# Patient Record
Sex: MALE | Race: WHITE | NOT HISPANIC OR LATINO | URBAN - METROPOLITAN AREA
[De-identification: names, ages, dates, MRNs, and addresses within clinical notes are randomized per-mention and may not be internally consistent; named-entity substitution may affect disease eponyms.]

---

## 2021-05-24 ENCOUNTER — OUTPATIENT (OUTPATIENT)
Dept: OUTPATIENT SERVICES | Facility: HOSPITAL | Age: 46
LOS: 1 days | End: 2021-05-24
Payer: COMMERCIAL

## 2021-05-24 VITALS
HEART RATE: 64 BPM | OXYGEN SATURATION: 97 % | HEIGHT: 72 IN | DIASTOLIC BLOOD PRESSURE: 86 MMHG | RESPIRATION RATE: 16 BRPM | TEMPERATURE: 97 F | SYSTOLIC BLOOD PRESSURE: 118 MMHG | WEIGHT: 240.08 LBS

## 2021-05-24 DIAGNOSIS — Z01.818 ENCOUNTER FOR OTHER PREPROCEDURAL EXAMINATION: ICD-10-CM

## 2021-05-24 DIAGNOSIS — M67.40 GANGLION, UNSPECIFIED SITE: ICD-10-CM

## 2021-05-24 DIAGNOSIS — M67.432 GANGLION, LEFT WRIST: ICD-10-CM

## 2021-05-24 DIAGNOSIS — M27.69 OTHER ENDOSSEOUS DENTAL IMPLANT FAILURE: Chronic | ICD-10-CM

## 2021-05-24 DIAGNOSIS — T85.848A PAIN DUE TO OTHER INTERNAL PROSTHETIC DEVICES, IMPLANTS AND GRAFTS, INITIAL ENCOUNTER: Chronic | ICD-10-CM

## 2021-05-24 LAB
HCT VFR BLD CALC: 45.7 % — SIGNIFICANT CHANGE UP (ref 39–50)
HGB BLD-MCNC: 15.1 G/DL — SIGNIFICANT CHANGE UP (ref 13–17)
MCHC RBC-ENTMCNC: 28.3 PG — SIGNIFICANT CHANGE UP (ref 27–34)
MCHC RBC-ENTMCNC: 33 GM/DL — SIGNIFICANT CHANGE UP (ref 32–36)
MCV RBC AUTO: 85.6 FL — SIGNIFICANT CHANGE UP (ref 80–100)
NRBC # BLD: 0 /100 WBCS — SIGNIFICANT CHANGE UP (ref 0–0)
PLATELET # BLD AUTO: 207 K/UL — SIGNIFICANT CHANGE UP (ref 150–400)
RBC # BLD: 5.34 M/UL — SIGNIFICANT CHANGE UP (ref 4.2–5.8)
RBC # FLD: 12.2 % — SIGNIFICANT CHANGE UP (ref 10.3–14.5)
WBC # BLD: 7.96 K/UL — SIGNIFICANT CHANGE UP (ref 3.8–10.5)
WBC # FLD AUTO: 7.96 K/UL — SIGNIFICANT CHANGE UP (ref 3.8–10.5)

## 2021-05-24 PROCEDURE — G0463: CPT

## 2021-05-24 PROCEDURE — 85027 COMPLETE CBC AUTOMATED: CPT

## 2021-05-24 PROCEDURE — 36415 COLL VENOUS BLD VENIPUNCTURE: CPT

## 2021-05-24 NOTE — H&P PST ADULT - NSICDXPROBLEM_GEN_ALL_CORE_FT
PROBLEM DIAGNOSES  Problem: Ganglion cyst  Assessment and Plan: Scheduled for excision of ganglion cyst of left wrist. Pre op testing today.  CBC,. To make appt for COVID PCR 48-72 before DOS going to [place]  Pre op instructions:  Hold OTC supplements. Medications reviewed for the week and morning of surgery.  NPO after 11pm to the morning of surgery.  Shower 2 days before and the morning of surgery with antibacterial soap as instructed.  Patient verbalized understanding.

## 2021-05-24 NOTE — H&P PST ADULT - NSICDXFAMILYHX_GEN_ALL_CORE_FT
FAMILY HISTORY:  Father  Still living? Yes, Estimated age: 71-80  FHx: heart disease, Age at diagnosis: Age Unknown

## 2021-05-24 NOTE — H&P PST ADULT - HISTORY OF PRESENT ILLNESS
44 y/o male with c/o pain/discomfort  in the left wrist for over a year. Seen in [facility] was referred to Dr Wang, diagnosed with left wrist ganglion. Scheduled for excision of ganglion cyst of left wrist. Pre op testing today.

## 2021-05-24 NOTE — H&P PST ADULT - NSANTHOSAYNRD_GEN_A_CORE
No. DANDY screening performed.  STOP BANG Legend: 0-2 = LOW Risk; 3-4 = INTERMEDIATE Risk; 5-8 = HIGH Risk

## 2021-05-26 PROBLEM — M67.432 GANGLION, LEFT WRIST: Chronic | Status: ACTIVE | Noted: 2021-05-24

## 2021-05-29 DIAGNOSIS — Z01.818 ENCOUNTER FOR OTHER PREPROCEDURAL EXAMINATION: ICD-10-CM

## 2021-05-29 PROBLEM — Z00.00 ENCOUNTER FOR PREVENTIVE HEALTH EXAMINATION: Status: ACTIVE | Noted: 2021-05-29

## 2021-05-31 ENCOUNTER — APPOINTMENT (OUTPATIENT)
Dept: DISASTER EMERGENCY | Facility: CLINIC | Age: 46
End: 2021-05-31

## 2021-06-01 LAB — SARS-COV-2 N GENE NPH QL NAA+PROBE: NOT DETECTED

## 2021-06-02 ENCOUNTER — TRANSCRIPTION ENCOUNTER (OUTPATIENT)
Age: 46
End: 2021-06-02

## 2021-06-02 NOTE — ASU PATIENT PROFILE, ADULT - PATIENT REPRESENTATIVE: ( YOU CAN CHOOSE ANY PERSON THAT CAN ASSIST YOU WITH YOUR HEALTH CARE PREFERENCES, DOES NOT HAVE TO BE A SPOUSE, IMMEDIATE FAMILY OR SIGNIFICANT OTHER/PARTNER)
----- Message from Aline Spence sent at 10/16/2019  4:01 PM CDT -----  Contact: MARK MCKEON JR.  Type:  Patient Returning Call    Who Called: MARK MCKEON JR.    Who Left Message for Patient: unknown     Does the patient know what this is regarding?: unknown    Can the clinic reply in [facility]: No    Best Call Back Number: [phone]    Additional Information: N/A     Yes

## 2021-06-03 ENCOUNTER — OUTPATIENT (OUTPATIENT)
Dept: OUTPATIENT SERVICES | Facility: HOSPITAL | Age: 46
LOS: 1 days | End: 2021-06-03
Payer: COMMERCIAL

## 2021-06-03 ENCOUNTER — RESULT REVIEW (OUTPATIENT)
Age: 46
End: 2021-06-03

## 2021-06-03 VITALS
OXYGEN SATURATION: 97 % | RESPIRATION RATE: 14 BRPM | DIASTOLIC BLOOD PRESSURE: 80 MMHG | HEART RATE: 59 BPM | TEMPERATURE: 98 F | HEIGHT: 72 IN | WEIGHT: 240.08 LBS | SYSTOLIC BLOOD PRESSURE: 116 MMHG

## 2021-06-03 VITALS
SYSTOLIC BLOOD PRESSURE: 120 MMHG | OXYGEN SATURATION: 97 % | DIASTOLIC BLOOD PRESSURE: 80 MMHG | RESPIRATION RATE: 14 BRPM | HEART RATE: 62 BPM

## 2021-06-03 DIAGNOSIS — M27.69 OTHER ENDOSSEOUS DENTAL IMPLANT FAILURE: Chronic | ICD-10-CM

## 2021-06-03 DIAGNOSIS — M67.432 GANGLION, LEFT WRIST: ICD-10-CM

## 2021-06-03 PROCEDURE — 88304 TISSUE EXAM BY PATHOLOGIST: CPT | Mod: 26

## 2021-06-03 PROCEDURE — 25111 REMOVE WRIST TENDON LESION: CPT | Mod: LT

## 2021-06-03 PROCEDURE — 88304 TISSUE EXAM BY PATHOLOGIST: CPT

## 2021-06-03 RX ORDER — HYDROMORPHONE HYDROCHLORIDE 2 MG/ML
0.5 INJECTION INTRAMUSCULAR; INTRAVENOUS; SUBCUTANEOUS
Refills: 0 | Status: DISCONTINUED | OUTPATIENT
Start: 2021-06-03 | End: 2021-06-03

## 2021-06-03 RX ORDER — CEFAZOLIN SODIUM 1 G
2000 VIAL (EA) INJECTION ONCE
Refills: 0 | Status: COMPLETED | OUTPATIENT
Start: 2021-06-03 | End: 2021-06-03

## 2021-06-03 RX ORDER — ONDANSETRON 8 MG/1
4 TABLET, FILM COATED ORAL ONCE
Refills: 0 | Status: DISCONTINUED | OUTPATIENT
Start: 2021-06-03 | End: 2021-06-03

## 2021-06-03 RX ORDER — SODIUM CHLORIDE 9 MG/ML
1000 INJECTION, SOLUTION INTRAVENOUS
Refills: 0 | Status: DISCONTINUED | OUTPATIENT
Start: 2021-06-03 | End: 2021-06-03

## 2021-06-03 RX ORDER — OXYCODONE HYDROCHLORIDE 5 MG/1
5 TABLET ORAL ONCE
Refills: 0 | Status: DISCONTINUED | OUTPATIENT
Start: 2021-06-03 | End: 2021-06-03

## 2021-06-03 RX ADMIN — SODIUM CHLORIDE 75 MILLILITER(S): 9 INJECTION, SOLUTION INTRAVENOUS at 07:10

## 2021-06-03 RX ADMIN — SODIUM CHLORIDE 75 MILLILITER(S): 9 INJECTION, SOLUTION INTRAVENOUS at 09:04

## 2021-06-03 NOTE — ASU DISCHARGE PLAN (ADULT/PEDIATRIC) - CARE PROVIDER_API CALL
CATHY Wang  [facility]  [address] [place] [address]  Phone: [phone]  Fax: [phone]  Follow Up Time:

## 2021-06-03 NOTE — ASU DISCHARGE PLAN (ADULT/PEDIATRIC) - ASU DC SPECIAL INSTRUCTIONSFT
Keep dressings clean, dry and intact at all times  Keep left upper extremity elevated to help decrease pain and swelling  Call Dr. Wang's office for an appointment for follow up on Monday 6/7/21  Rx for pain and antibiotics called in previously from the office by Dr. Wang

## 2021-06-04 LAB — SURGICAL PATHOLOGY STUDY: SIGNIFICANT CHANGE UP

## 2022-09-07 NOTE — H&P PST ADULT - VENOUS THROMBOEMBOLISM HISTORY
Myalgia Treatment: I explained this is common when taking isotretinoin. If this worsens they will contact us. They may try OTC ibuprofen. (0) indicator not present

## 2023-05-01 NOTE — ASU PATIENT PROFILE, ADULT - NS PRO PASSIVE SMOKE EXP
- WE WILL CALL TO SCHEDULE YOU FOR A FOLLOW-UP WITH DR. NADIA [place] IN 3 MONTHS  - FOLLOW-UP WITH DR. POLLARD [facility] AND DR. DELGADO AFTER RADIATION COMPLETION  - SIDE EFFECTS OF RADIATION WILL GRADUALLY SUBSIDE. IT MAY TAKE 1- 2 WEEKS POST-RADIATION FOR YOU TO NOTICE CHANGES SUCH AS A DECREASE IN YOUR FATIGUE LEVEL, DECREASE IN REDNESS AND/OR DRYNESS OF TREATED SKIN AREA, DECREASE IN DISCOMFORT TO TREATED SITE.   - CONTINUE WITH SKIN CARE: APPLY MILD CREAM TO TREATED AREA 2-3X/DAY, DO NOT USE HOT/COLD PACKS DIRECTLY ON SITE, USE MILD SOAP/DEODORANT TO AFFECTED AREA, KEEP OPEN TO AIR WHEN AT HOME. - CALL THE NURSE LINE AT [phone] IF YOU HAVE ANY QUESTIONS/CONCERNS REGARDING RADIATION THERAPY.
No

## 2024-02-27 NOTE — ASU DISCHARGE PLAN (ADULT/PEDIATRIC) - D. IF YOU HAD ANY TYPE OF SEDATION, YOU MAY EXPERIENCE LIGHTHEADEDNESS, DIZZINESS, OR SLEEPINESS FOLLOWING YOUR PROCEDURE. A RESPONSIBLE ADULT SHOULD STAY WITH YOU FOR AT LEAST 24 HOURS FOLLOWING YOUR PROCEDURE.

## 2024-10-14 NOTE — H&P PST ADULT - TEACHING/LEARNING RELIGIOUS CONSIDERATIONS
32 M with PMHx of UC (diagnosed 2016) who was transferred from [facility] after admission of UC flare that did not respond to medical management on [place] (given pulse steroids while at [facility] as well) for consideration of surgical management of UC. He is now s/p total abdominal colectomy with end ileostomy on 10/11, recovering well.    P:  - C/w PO prednisone 40mg daily   - F/u path  - LR @ 100  - Diet: advance to LRD   - VTE ppx: LVX   - Pain control      [facility]  [id number], [phone]  A/P:    32M now POD 1 from laparoscopic total abdominal colectomy w/ end ileostomy.     -CLD  -mIVF  -Monitor ostomy output  -PO Prednisone 40mg daily  -DVT ppx  -F/u path 32 M with PMHx of UC (diagnosed 2016) who was transferred from [facility] after admission of UC flare that did not respond to medical management on [place] (given pulse steroids while at [facility] as well) for consideration of surgical management of UC. He is now s/p total abdominal colectomy with end ileostomy on 10/11, recovering well.    P:  - C/w PO prednisone 40mg daily, endo consulted today, appreciate recs  - surgical path showing chronic colitis and proctitis  - LR @ 100  - Diet: advance to LRD   - VTE ppx: LVX   - Pain control      [facility]  [id number], [phone]  none